# Patient Record
Sex: FEMALE | Race: WHITE | NOT HISPANIC OR LATINO | URBAN - METROPOLITAN AREA
[De-identification: names, ages, dates, MRNs, and addresses within clinical notes are randomized per-mention and may not be internally consistent; named-entity substitution may affect disease eponyms.]

---

## 2023-05-23 LAB
ALANINE AMINOTRANSFERASE (SGPT) (U/L) IN SER/PLAS: 19 U/L (ref 7–45)
ALBUMIN (G/DL) IN SER/PLAS: 4.5 G/DL (ref 3.4–5)
ALBUMIN (G/DL) IN SER/PLAS: 4.5 G/DL (ref 3.4–5)
ALKALINE PHOSPHATASE (U/L) IN SER/PLAS: 48 U/L (ref 33–110)
ALPHA 1 ANTITRYPSIN (MG/DL) IN SER/PLAS: 141 MG/DL (ref 84–218)
ANION GAP IN SER/PLAS: 13 MMOL/L (ref 10–20)
ASPARTATE AMINOTRANSFERASE (SGOT) (U/L) IN SER/PLAS: 18 U/L (ref 9–39)
BASOPHILS (10*3/UL) IN BLOOD BY AUTOMATED COUNT: 0.03 X10E9/L (ref 0–0.1)
BASOPHILS/100 LEUKOCYTES IN BLOOD BY AUTOMATED COUNT: 0.3 % (ref 0–2)
BILIRUBIN DIRECT (MG/DL) IN SER/PLAS: 0.1 MG/DL (ref 0–0.3)
BILIRUBIN TOTAL (MG/DL) IN SER/PLAS: 0.4 MG/DL (ref 0–1.2)
CALCIUM (MG/DL) IN SER/PLAS: 9.5 MG/DL (ref 8.6–10.3)
CARBON DIOXIDE, TOTAL (MMOL/L) IN SER/PLAS: 21 MMOL/L (ref 21–32)
CERULOPLASMIN (MG/DL) IN SER/PLAS: 28 MG/DL (ref 20–60)
CHLORIDE (MMOL/L) IN SER/PLAS: 108 MMOL/L (ref 98–107)
CREATININE (MG/DL) IN SER/PLAS: 1.27 MG/DL (ref 0.5–1.05)
EOSINOPHILS (10*3/UL) IN BLOOD BY AUTOMATED COUNT: 0.02 X10E9/L (ref 0–0.7)
EOSINOPHILS/100 LEUKOCYTES IN BLOOD BY AUTOMATED COUNT: 0.2 % (ref 0–6)
ERYTHROCYTE DISTRIBUTION WIDTH (RATIO) BY AUTOMATED COUNT: 13.1 % (ref 11.5–14.5)
ERYTHROCYTE MEAN CORPUSCULAR HEMOGLOBIN CONCENTRATION (G/DL) BY AUTOMATED: 32.7 G/DL (ref 32–36)
ERYTHROCYTE MEAN CORPUSCULAR VOLUME (FL) BY AUTOMATED COUNT: 96 FL (ref 80–100)
ERYTHROCYTES (10*6/UL) IN BLOOD BY AUTOMATED COUNT: 4.14 X10E12/L (ref 4–5.2)
FERRITIN (UG/LL) IN SER/PLAS: 118 UG/L (ref 8–150)
GFR FEMALE: 55 ML/MIN/1.73M2
GLUCOSE (MG/DL) IN SER/PLAS: 101 MG/DL (ref 74–99)
HEMATOCRIT (%) IN BLOOD BY AUTOMATED COUNT: 39.7 % (ref 36–46)
HEMOGLOBIN (G/DL) IN BLOOD: 13 G/DL (ref 12–16)
IGA (MG/DL) IN SER/PLAS: 404 MG/DL (ref 70–400)
IGG (MG/DL) IN SER/PLAS: 1630 MG/DL (ref 700–1600)
IGM (MG/DL) IN SER/PLAS: 71 MG/DL (ref 40–230)
IMMATURE GRANULOCYTES/100 LEUKOCYTES IN BLOOD BY AUTOMATED COUNT: 0.3 % (ref 0–0.9)
INR IN PPP BY COAGULATION ASSAY: 1 (ref 0.9–1.1)
IRON (UG/DL) IN SER/PLAS: 135 UG/DL (ref 35–150)
IRON BINDING CAPACITY (UG/DL) IN SER/PLAS: 322 UG/DL (ref 240–445)
IRON SATURATION (%) IN SER/PLAS: 42 % (ref 25–45)
LEUKOCYTES (10*3/UL) IN BLOOD BY AUTOMATED COUNT: 9.1 X10E9/L (ref 4.4–11.3)
LYMPHOCYTES (10*3/UL) IN BLOOD BY AUTOMATED COUNT: 1.26 X10E9/L (ref 1.2–4.8)
LYMPHOCYTES/100 LEUKOCYTES IN BLOOD BY AUTOMATED COUNT: 13.8 % (ref 13–44)
MONOCYTES (10*3/UL) IN BLOOD BY AUTOMATED COUNT: 0.33 X10E9/L (ref 0.1–1)
MONOCYTES/100 LEUKOCYTES IN BLOOD BY AUTOMATED COUNT: 3.6 % (ref 2–10)
NEUTROPHILS (10*3/UL) IN BLOOD BY AUTOMATED COUNT: 7.44 X10E9/L (ref 1.2–7.7)
NEUTROPHILS/100 LEUKOCYTES IN BLOOD BY AUTOMATED COUNT: 81.8 % (ref 40–80)
PHOSPHATE (MG/DL) IN SER/PLAS: 2.4 MG/DL (ref 2.5–4.9)
PLATELETS (10*3/UL) IN BLOOD AUTOMATED COUNT: 235 X10E9/L (ref 150–450)
POTASSIUM (MMOL/L) IN SER/PLAS: 3.7 MMOL/L (ref 3.5–5.3)
PROTEIN TOTAL: 8.1 G/DL (ref 6.4–8.2)
PROTHROMBIN TIME (PT) IN PPP BY COAGULATION ASSAY: 11.1 SEC (ref 9.8–13.4)
SODIUM (MMOL/L) IN SER/PLAS: 138 MMOL/L (ref 136–145)
UREA NITROGEN (MG/DL) IN SER/PLAS: 15 MG/DL (ref 6–23)

## 2023-05-24 LAB
ALPHA-1 FETOPROTEIN (NG/ML) IN SER/PLAS: <4 NG/ML (ref 0–9)
ANA PATTERN: ABNORMAL
ANA TITER: ABNORMAL
ANTI-CENTROMERE: <0.2 AI
ANTI-CHROMATIN: <0.2 AI
ANTI-DNA (DS): <1 IU/ML
ANTI-JO-1 IGG: <0.2 AI
ANTI-NUCLEAR ANTIBODY (ANA): POSITIVE
ANTI-RIBOSOMAL P: <0.2 AI
ANTI-RNP: <0.2 AI
ANTI-SCL-70: <0.2 AI
ANTI-SM/RNP: <0.2 AI
ANTI-SM: <0.2 AI
ANTI-SMOOTH MUSCLE ANTIBODY: NEGATIVE
ANTI-SSA: >8 AI
ANTI-SSB: >8 AI
HEPATITIS A TOTAL AB INTERPRETATION: REACTIVE
HEPATITIS B VIRUS CORE AB (PRESENCE) IN SER/PLAS BY IMM: NONREACTIVE
HEPATITIS B VIRUS SURFACE AB (MIU/ML) IN SERUM: <3.1 MIU/ML
HEPATITIS B VIRUS SURFACE AG PRESENCE IN SERUM: NONREACTIVE
HEPATITIS C VIRUS AB PRESENCE IN SERUM: NONREACTIVE
MITOCHONDRIAL ANTIBODY: NEGATIVE

## 2023-05-26 LAB
EER PETH: NORMAL
LIVER/KIDNEY MICROSOME TYPE 1 ANTIBODIES: NORMAL
PETH 16:0/18:1 (POPETH): 45 NG/ML
PETH 16:0/18:2 (PLPETH): 32 NG/ML
SOLUBLE LIVER ANTIGEN ANTIBODY, IGG: 1.3 U (ref 0–24.9)

## 2023-06-13 ENCOUNTER — HOSPITAL ENCOUNTER (OUTPATIENT)
Dept: DATA CONVERSION | Facility: HOSPITAL | Age: 38
End: 2023-06-13
Attending: RADIOLOGY | Admitting: RADIOLOGY

## 2023-06-13 DIAGNOSIS — R79.89 OTHER SPECIFIED ABNORMAL FINDINGS OF BLOOD CHEMISTRY: ICD-10-CM

## 2023-06-13 DIAGNOSIS — K76.0 FATTY (CHANGE OF) LIVER, NOT ELSEWHERE CLASSIFIED: ICD-10-CM

## 2023-06-13 DIAGNOSIS — K75.4 AUTOIMMUNE HEPATITIS (MULTI): ICD-10-CM

## 2023-06-29 LAB
COMPLETE PATHOLOGY REPORT: NORMAL
CONVERTED CLINICAL DIAGNOSIS-HISTORY: NORMAL
CONVERTED FINAL DIAGNOSIS: NORMAL
CONVERTED FINAL REPORT PDF LINK TO COPY AND PASTE: NORMAL
CONVERTED GROSS DESCRIPTION: NORMAL

## 2023-11-01 ENCOUNTER — TELEPHONE (OUTPATIENT)
Dept: GASTROENTEROLOGY | Facility: HOSPITAL | Age: 38
End: 2023-11-01

## 2023-11-01 NOTE — TELEPHONE ENCOUNTER
Hepatology Nurse Care Coordinator Note    Patient calling for recent lab results.  Left message for patient.  Labs normal.      HEPATIC FUNCTION PNL  Order: [id number]  Component  Ref Range & Units 7 d ago   Albumin  3.9 - 4.9 g/dL 4.1   Bilirubin, Total  0.2 - 1.3 mg/dL 0.2   Bilirubin, Conjugated  <0.2 mg/dL <0.2   Alkaline Phosphatase  34 - 123 U/L 79   AST  13 - 35 U/L 24   ALT  7 - 38 U/L 26   Protein, Total  6.3 - 8.0 g/dL 7.9   Resulting Agency [facility]     Specimen Collected: 10/25/23 17:08    Performed by: [facility] Last Resulted: 10/25/23 17:59   Received From: [facility]  Result Received: 11/01/23 10:44

## 2023-12-20 PROBLEM — K75.9 HEPATITIS: Status: RESOLVED | Noted: 2021-02-16 | Resolved: 2023-12-20

## 2023-12-20 PROBLEM — N30.90 CYSTITIS: Status: ACTIVE | Noted: 2023-12-14

## 2023-12-20 PROBLEM — R10.9 FLANK PAIN: Status: RESOLVED | Noted: 2023-12-14 | Resolved: 2023-12-20

## 2023-12-20 PROBLEM — F33.1 DEPRESSION, MAJOR, RECURRENT, MODERATE (MULTI): Status: ACTIVE | Noted: 2023-12-20

## 2023-12-20 PROBLEM — M35.9 CONNECTIVE TISSUE DISEASE (MULTI): Status: ACTIVE | Noted: 2022-03-07

## 2023-12-20 PROBLEM — K58.9 IRRITABLE BOWEL SYNDROME: Status: ACTIVE | Noted: 2023-05-26

## 2023-12-20 PROBLEM — S09.90XA CLOSED HEAD INJURY: Status: ACTIVE | Noted: 2023-12-14

## 2023-12-20 PROBLEM — M05.79 RHEUMATOID ARTHRITIS INVOLVING MULTIPLE SITES WITH POSITIVE RHEUMATOID FACTOR (MULTI): Status: ACTIVE | Noted: 2023-05-26

## 2023-12-20 PROBLEM — M35.00 SJOGREN SYNDROME, UNSPECIFIED (MULTI): Status: ACTIVE | Noted: 2023-05-26

## 2023-12-20 PROBLEM — F41.8 ANXIETY WITH DEPRESSION: Status: ACTIVE | Noted: 2023-02-09
